# Patient Record
Sex: FEMALE | Race: WHITE | NOT HISPANIC OR LATINO | Employment: FULL TIME | URBAN - METROPOLITAN AREA
[De-identification: names, ages, dates, MRNs, and addresses within clinical notes are randomized per-mention and may not be internally consistent; named-entity substitution may affect disease eponyms.]

---

## 2018-04-02 PROBLEM — E04.1 THYROID NODULE: Status: ACTIVE | Noted: 2018-04-02

## 2018-06-19 PROBLEM — F41.1 GAD (GENERALIZED ANXIETY DISORDER): Status: ACTIVE | Noted: 2018-06-19

## 2018-06-19 PROBLEM — E03.9 HYPOTHYROIDISM: Status: ACTIVE | Noted: 2018-06-19

## 2019-11-20 PROBLEM — R10.13 EPIGASTRIC PAIN: Status: ACTIVE | Noted: 2019-11-20

## 2020-01-27 PROBLEM — R10.13 EPIGASTRIC PAIN: Status: RESOLVED | Noted: 2019-11-20 | Resolved: 2020-01-27

## 2021-03-22 PROBLEM — K64.9 HEMORRHOIDS: Status: ACTIVE | Noted: 2021-03-22

## 2021-03-29 ENCOUNTER — IMMUNIZATION (OUTPATIENT)
Dept: FAMILY MEDICINE | Facility: CLINIC | Age: 61
End: 2021-03-29
Payer: COMMERCIAL

## 2021-03-29 DIAGNOSIS — Z23 NEED FOR VACCINATION: Primary | ICD-10-CM

## 2021-03-29 PROCEDURE — 91300 COVID-19, MRNA, LNP-S, PF, 30 MCG/0.3 ML DOSE VACCINE: ICD-10-PCS | Mod: ,,, | Performed by: FAMILY MEDICINE

## 2021-03-29 PROCEDURE — 0001A COVID-19, MRNA, LNP-S, PF, 30 MCG/0.3 ML DOSE VACCINE: ICD-10-PCS | Mod: CV19,,, | Performed by: FAMILY MEDICINE

## 2021-03-29 PROCEDURE — 0001A COVID-19, MRNA, LNP-S, PF, 30 MCG/0.3 ML DOSE VACCINE: CPT | Mod: CV19,,, | Performed by: FAMILY MEDICINE

## 2021-03-29 PROCEDURE — 91300 COVID-19, MRNA, LNP-S, PF, 30 MCG/0.3 ML DOSE VACCINE: CPT | Mod: ,,, | Performed by: FAMILY MEDICINE

## 2021-04-19 ENCOUNTER — IMMUNIZATION (OUTPATIENT)
Dept: FAMILY MEDICINE | Facility: CLINIC | Age: 61
End: 2021-04-19
Payer: COMMERCIAL

## 2021-04-19 DIAGNOSIS — Z23 NEED FOR VACCINATION: Primary | ICD-10-CM

## 2021-04-19 PROCEDURE — 91300 COVID-19, MRNA, LNP-S, PF, 30 MCG/0.3 ML DOSE VACCINE: CPT | Mod: PBBFAC | Performed by: INTERNAL MEDICINE

## 2021-04-19 PROCEDURE — 0002A COVID-19, MRNA, LNP-S, PF, 30 MCG/0.3 ML DOSE VACCINE: CPT | Mod: PBBFAC | Performed by: INTERNAL MEDICINE

## 2021-10-11 PROBLEM — L70.9 ACNE: Status: ACTIVE | Noted: 2021-10-11

## 2021-10-11 PROBLEM — G47.00 INSOMNIA: Status: ACTIVE | Noted: 2021-10-11

## 2022-08-12 PROBLEM — Z79.890 HORMONE REPLACEMENT THERAPY (HRT): Status: ACTIVE | Noted: 2022-08-12

## 2022-10-21 PROBLEM — Z86.16 HISTORY OF COVID-19: Status: ACTIVE | Noted: 2022-08-04

## 2023-01-30 PROBLEM — M41.9 SCOLIOSIS: Status: ACTIVE | Noted: 2023-01-30

## 2024-03-07 PROBLEM — K30 DELAYED GASTRIC EMPTYING: Status: ACTIVE | Noted: 2024-03-07

## 2024-08-23 ENCOUNTER — TELEPHONE (OUTPATIENT)
Dept: DERMATOLOGY | Facility: CLINIC | Age: 64
End: 2024-08-23
Payer: MEDICARE

## 2024-08-26 ENCOUNTER — TELEPHONE (OUTPATIENT)
Dept: DERMATOLOGY | Facility: CLINIC | Age: 64
End: 2024-08-26
Payer: MEDICARE

## 2024-08-26 NOTE — TELEPHONE ENCOUNTER
----- Message from Jessica Blanco sent at 8/26/2024  2:12 PM CDT -----  Contact: PT  Type:  Sooner Appointment Request    Caller is requesting a sooner appointment.  Caller declined first available appointment listed below.  Caller will not accept being placed on the waitlist and is requesting a message be sent to doctor.    Name of Caller:  PT  When is the first available appointment?  No solutions  Symptoms:  Scalp irritation  Would the patient rather a call back or a response via [facility]?  Call back  Best Call Back Number:  [phone]  Additional Information:  PT has referral in system to see provider

## 2024-09-12 PROBLEM — Z78.0 POST-MENOPAUSAL: Status: ACTIVE | Noted: 2024-09-12

## 2024-09-23 ENCOUNTER — TELEPHONE (OUTPATIENT)
Dept: DERMATOLOGY | Facility: CLINIC | Age: 64
End: 2024-09-23

## 2024-09-23 ENCOUNTER — OFFICE VISIT (OUTPATIENT)
Dept: DERMATOLOGY | Facility: CLINIC | Age: 64
End: 2024-09-23
Payer: MEDICARE

## 2024-09-23 DIAGNOSIS — L82.1 SEBORRHEIC KERATOSES: Primary | ICD-10-CM

## 2024-09-23 DIAGNOSIS — R23.8 SCALP IRRITATION: ICD-10-CM

## 2024-09-23 DIAGNOSIS — L81.4 LENTIGO: ICD-10-CM

## 2024-09-23 DIAGNOSIS — L70.0 ACNE VULGARIS: ICD-10-CM

## 2024-09-23 DIAGNOSIS — L70.9 ACNE, UNSPECIFIED ACNE TYPE: ICD-10-CM

## 2024-09-23 PROCEDURE — 99203 OFFICE O/P NEW LOW 30 MIN: CPT | Mod: S$GLB,,, | Performed by: DERMATOLOGY

## 2024-09-23 PROCEDURE — 99999 PR PBB SHADOW E&M-EST. PATIENT-LVL I: CPT | Mod: PBBFAC,,, | Performed by: DERMATOLOGY

## 2024-09-23 RX ORDER — SPIRONOLACTONE 100 MG/1
TABLET, FILM COATED ORAL
Qty: 90 TABLET | Refills: 1 | Status: SHIPPED | OUTPATIENT
Start: 2024-09-23

## 2024-09-23 RX ORDER — SPIRONOLACTONE 50 MG/1
100 TABLET, FILM COATED ORAL DAILY
Qty: 90 TABLET | Refills: 3 | Status: SHIPPED | OUTPATIENT
Start: 2024-09-23 | End: 2024-09-23

## 2024-09-23 NOTE — TELEPHONE ENCOUNTER
Please resent the spironolactone for 180 tablets, patient was told to take 100 mg ( 2 tabs daily) at her visit today but only 90 where sent in. Please resend the medication with 180 tablets as the pharmacy wont fill it for the 90 please. (3 month supply)

## 2024-09-23 NOTE — TELEPHONE ENCOUNTER
----- Message from Dina Garza sent at 9/23/2024 11:10 AM CDT -----  Type: Needs Medical Advice  Who Called:  Patient   Symptoms (please be specific):    How long has patient had these symptoms:    Pharmacy name and phone #:    Best Call Back Number: [phone]  Additional Information: Patient is requesting a call back from the nurse regarding her meds..

## 2024-09-23 NOTE — PROGRESS NOTES
Dermatology Outpatient Clinic Progress Note    Patient Name: Alison Parekh  Patient : 1960  MRN: [id number]  Date of Service: 2024    CC/HPI: Alison Parekh is a 64 y.o. year old female with history of  family history of skin cancer- sister had skin cancer  who presents today for check on lesion on scalp.  Patient reports waxy white lesion on scalp that has been there about 4 weeks. Patient reports lesion was itchy, not painful and did not bleed. Former patient of Dr. Franklin, has done accutane in the past, is on keiko 50 but still gets painful acne lesions in a hormonal distribution and a little bit of hirsutism by history.     ROS:   Dermatological ROS: positive for acne and skin lesion changes    Physical Exam   Head       Chest   Chest comments: Scattered lentigo chest and upper arms      Back       Abdomen       Lower extremities         Diagram Legend     Erythematous scaling macule/papule c/w actinic keratosis       Vascular papule c/w angioma      Pigmented verrucoid papule/plaque c/w seborrheic keratosis      Yellow umbilicated papule c/w sebaceous hyperplasia      Irregularly shaped tan macule c/w lentigo     1-2 mm smooth white papules consistent with Milia      Movable subcutaneous cyst with punctum c/w epidermal inclusion cyst      Subcutaneous movable cyst c/w pilar cyst      Firm pink to brown papule c/w dermatofibroma      Pedunculated fleshy papule(s) c/w skin tag(s)      Evenly pigmented macule c/w junctional nevus     Mildly variegated pigmented, slightly irregular-bordered macule c/w mildly atypical nevus      Flesh colored to evenly pigmented papule c/w intradermal nevus       Pink pearly papule/plaque c/w basal cell carcinoma      Erythematous hyperkeratotic cursted plaque c/w SCC      Surgical scar with no sign of skin cancer recurrence      Open and closed comedones      Inflammatory papules and pustules      Verrucoid papule consistent consistent with wart      Erythematous eczematous patches and plaques     Dystrophic onycholytic nail with subungual debris c/w onychomycosis     Umbilicated papule    Erythematous-base heme-crusted tan verrucoid plaque consistent with inflamed seborrheic keratosis     Erythematous Silvery Scaling Plaque c/w Psoriasis     See annotation    Assessment/Plan:    Diagnoses and all orders for this visit:    Seborrheic keratoses  - Benign nature of lesion discussed, monitor for changes    Scalp Lesions  - likely ISK, resolved on it's own    Acne - HORMONAL   - increase keiko from , decrease banana intake to QOD or QOOD, labs in 6 weeks due to previous hyperkalemia  -     spironolactone (ALDACTONE) 50 MG tablet; Take 2 tablets (100 mg total) by mouth once daily.  -     green cream lvl 9 too irritating - sent tretinoin 0.025% cream with hyaluronic acid via [facility]  Lentigo  - Benign nature of lesion discussed, monitor for changes  - Signs of lentigo maligna discussed, patient to rtc for suspicious changes    Follow up in 6 weeks for recheck K         Dat Ceja MD FAAD

## 2024-11-04 DIAGNOSIS — L70.0 ACNE VULGARIS: Primary | ICD-10-CM

## 2024-11-11 ENCOUNTER — OFFICE VISIT (OUTPATIENT)
Dept: DERMATOLOGY | Facility: CLINIC | Age: 64
End: 2024-11-11
Payer: MEDICARE

## 2024-11-11 ENCOUNTER — LAB VISIT (OUTPATIENT)
Dept: LAB | Facility: HOSPITAL | Age: 64
End: 2024-11-11
Attending: INTERNAL MEDICINE
Payer: MEDICARE

## 2024-11-11 DIAGNOSIS — L70.0 ACNE VULGARIS: Primary | ICD-10-CM

## 2024-11-11 DIAGNOSIS — L70.9 ACNE, UNSPECIFIED ACNE TYPE: ICD-10-CM

## 2024-11-11 DIAGNOSIS — L70.9 ACNE, UNSPECIFIED ACNE TYPE: Primary | ICD-10-CM

## 2024-11-11 DIAGNOSIS — L68.0 HIRSUTISM: ICD-10-CM

## 2024-11-11 LAB
ANION GAP SERPL CALC-SCNC: 7 MMOL/L (ref 8–16)
BUN SERPL-MCNC: 12 MG/DL (ref 8–23)
CALCIUM SERPL-MCNC: 9.1 MG/DL (ref 8.7–10.5)
CHLORIDE SERPL-SCNC: 103 MMOL/L (ref 95–110)
CO2 SERPL-SCNC: 26 MMOL/L (ref 23–29)
CREAT SERPL-MCNC: 0.8 MG/DL (ref 0.5–1.4)
EST. GFR  (NO RACE VARIABLE): >60 ML/MIN/1.73 M^2
GLUCOSE SERPL-MCNC: 91 MG/DL (ref 70–110)
POTASSIUM SERPL-SCNC: 4.3 MMOL/L (ref 3.5–5.1)
SODIUM SERPL-SCNC: 136 MMOL/L (ref 136–145)

## 2024-11-11 PROCEDURE — 36415 COLL VENOUS BLD VENIPUNCTURE: CPT | Mod: PN | Performed by: DERMATOLOGY

## 2024-11-11 PROCEDURE — 80048 BASIC METABOLIC PNL TOTAL CA: CPT | Mod: PN | Performed by: DERMATOLOGY

## 2024-11-11 PROCEDURE — 99213 OFFICE O/P EST LOW 20 MIN: CPT | Mod: S$GLB,,, | Performed by: DERMATOLOGY

## 2024-11-11 NOTE — PROGRESS NOTES
Dermatology Outpatient Clinic Progress Note    Patient Name: Alison Parekh  Patient : 1960  MRN: [id number]  Date of Service: 2024    CC/HPI: Alison Parekh is a 64 y.o. year old female with history of cystic acne who presents today for 6 wk f/u  Patient reports taking spironolactone 100mg PO x 6wk, no side effects.     ROS:   Dermatological ROS: positive for skin lesion changes    Physical Exam   Head   Head comments: Few cystic lesions since last visit, definitely improved since starting keiko           Diagram Legend     Erythematous scaling macule/papule c/w actinic keratosis       Vascular papule c/w angioma      Pigmented verrucoid papule/plaque c/w seborrheic keratosis      Yellow umbilicated papule c/w sebaceous hyperplasia      Irregularly shaped tan macule c/w lentigo     1-2 mm smooth white papules consistent with Milia      Movable subcutaneous cyst with punctum c/w epidermal inclusion cyst      Subcutaneous movable cyst c/w pilar cyst      Firm pink to brown papule c/w dermatofibroma      Pedunculated fleshy papule(s) c/w skin tag(s)      Evenly pigmented macule c/w junctional nevus     Mildly variegated pigmented, slightly irregular-bordered macule c/w mildly atypical nevus      Flesh colored to evenly pigmented papule c/w intradermal nevus       Pink pearly papule/plaque c/w basal cell carcinoma      Erythematous hyperkeratotic cursted plaque c/w SCC      Surgical scar with no sign of skin cancer recurrence      Open and closed comedones      Inflammatory papules and pustules      Verrucoid papule consistent consistent with wart     Erythematous eczematous patches and plaques     Dystrophic onycholytic nail with subungual debris c/w onychomycosis     Umbilicated papule    Erythematous-base heme-crusted tan verrucoid plaque consistent with inflamed seborrheic keratosis     Erythematous Silvery Scaling Plaque c/w Psoriasis     See annotation    Assessment/Plan:    Diagnoses and  all orders for this visit:    Acne vulgaris  - spironolactone 100mg QD doing well  - check k+ today BMP future ordered  - follow up 3mo    Hirsutism  - also improved on spironolactone   - check bath 3mo     Follow up in 3 months        Dat Ceja MD FAAD

## 2024-11-21 ENCOUNTER — TELEPHONE (OUTPATIENT)
Dept: DERMATOLOGY | Facility: CLINIC | Age: 64
End: 2024-11-21
Payer: MEDICARE

## 2024-11-21 NOTE — TELEPHONE ENCOUNTER
----- Message from Dat Ceja MD sent at 11/21/2024  1:09 PM CST -----  Sent same Rx with increased Tret to .05%  ----- Message -----  From: Ermelinda Mary MA  Sent: 11/20/2024   4:13 PM CST  To: Dat Ceja MD    Patient called asking for a refill on her tretinoin to [facility].   She is wanting to go up in dose, she is currently using 0.25% requesting 0.50% with refills please  ----- Message -----  From: Dina Garza  Sent: 11/20/2024  12:56 PM CST  To: Brenna Daugherty Staff    Type: Needs Medical Advice  Who Called:  patient  Symptoms (please be specific):    How long has patient had these symptoms:   Pharmacy name and phone #:    Best Call Back Number: [phone]  Additional Information: Patient is requesting a call back regarding a new prescript being sent to pharmacy.

## 2025-02-10 ENCOUNTER — OFFICE VISIT (OUTPATIENT)
Dept: DERMATOLOGY | Facility: CLINIC | Age: 65
End: 2025-02-10
Payer: MEDICARE

## 2025-02-10 DIAGNOSIS — D48.5 NEOPLASM OF UNCERTAIN BEHAVIOR OF SKIN: ICD-10-CM

## 2025-02-10 DIAGNOSIS — L70.0 ACNE VULGARIS: Primary | ICD-10-CM

## 2025-02-10 PROCEDURE — 1159F MED LIST DOCD IN RCRD: CPT | Mod: CPTII,S$GLB,, | Performed by: DERMATOLOGY

## 2025-02-10 PROCEDURE — 99999 PR PBB SHADOW E&M-EST. PATIENT-LVL II: CPT | Mod: PBBFAC,,, | Performed by: DERMATOLOGY

## 2025-02-10 PROCEDURE — 99214 OFFICE O/P EST MOD 30 MIN: CPT | Mod: S$GLB,,, | Performed by: DERMATOLOGY

## 2025-02-10 PROCEDURE — 1101F PT FALLS ASSESS-DOCD LE1/YR: CPT | Mod: CPTII,S$GLB,, | Performed by: DERMATOLOGY

## 2025-02-10 PROCEDURE — 3288F FALL RISK ASSESSMENT DOCD: CPT | Mod: CPTII,S$GLB,, | Performed by: DERMATOLOGY

## 2025-02-10 NOTE — PROGRESS NOTES
Dermatology Outpatient Clinic Progress Note    Patient Name: Alison Parekh  Patient : 1960  MRN: [id number]  Date of Service: 2/10/2025    CC/HPI: Alison Parekh is a 64 y.o. year old female with history of  cystic acne  who presents today for follow up on spironolactone.  Patient reports she cut back to 50mg 2-3 weeks ago as she was feeling tired and rings were getting tired, she did have a breakout after cutting dosage.    ROS:   Dermatological ROS: positive for skin lesion changes    Physical Exam   Head       Abdomen   Abdomen comments: Irritated papule consistent with irritated seb keratosis vs. Molloscum vs. other          Diagram Legend     Erythematous scaling macule/papule c/w actinic keratosis       Vascular papule c/w angioma      Pigmented verrucoid papule/plaque c/w seborrheic keratosis      Yellow umbilicated papule c/w sebaceous hyperplasia      Irregularly shaped tan macule c/w lentigo     1-2 mm smooth white papules consistent with Milia      Movable subcutaneous cyst with punctum c/w epidermal inclusion cyst      Subcutaneous movable cyst c/w pilar cyst      Firm pink to brown papule c/w dermatofibroma      Pedunculated fleshy papule(s) c/w skin tag(s)      Evenly pigmented macule c/w junctional nevus     Mildly variegated pigmented, slightly irregular-bordered macule c/w mildly atypical nevus      Flesh colored to evenly pigmented papule c/w intradermal nevus       Pink pearly papule/plaque c/w basal cell carcinoma      Erythematous hyperkeratotic cursted plaque c/w SCC      Surgical scar with no sign of skin cancer recurrence      Open and closed comedones      Inflammatory papules and pustules      Verrucoid papule consistent consistent with wart     Erythematous eczematous patches and plaques     Dystrophic onycholytic nail with subungual debris c/w onychomycosis     Umbilicated papule    Erythematous-base heme-crusted tan verrucoid plaque consistent with inflamed seborrheic  "keratosis     Erythematous Silvery Scaling Plaque c/w Psoriasis     See annotation    Assessment/Plan:    Alison Zaragoza" was seen today for acne.    Diagnoses and all orders for this visit:    Acne vulgaris - flaring  - Pt. Decreased from 100mg QD to 50mg QD spironolactone  - Had some hand and foot swelling and fatigue that seemed worse on 100mg   - Reports fatigue and swelling resolved, advised to consider re-trial possibly of the higher dose now that symptoms resolved  - last K+ was WNL after increase  - Trial of BP wash QAM 3.5% - followed by clascoterone  - continue retinA 0.5% QHS - irrtating - counseled to dilute with her neutrogena hydroboost  - trial of Winlevi as this should avoid systemic SE of keiko and irritation of topical retinA.   - Has failed other topicals in the past including clinda and dapsone by history.     NUB abdomen - new   - molloscum vs. Isk vs. VV vs. NMSC vs. Other - excoriated on exam today  - re-eval in 2 months RTC if changing or growing for biopsy    Follow up in 2 months        Dat Ceja MD FAAD        "

## 2025-04-23 ENCOUNTER — PROCEDURE VISIT (OUTPATIENT)
Dept: DERMATOLOGY | Facility: CLINIC | Age: 65
End: 2025-04-23
Payer: MEDICARE

## 2025-04-23 DIAGNOSIS — D17.9 LIPOMA, UNSPECIFIED SITE: ICD-10-CM

## 2025-04-23 DIAGNOSIS — D22.9 MULTIPLE NEVI: ICD-10-CM

## 2025-04-23 DIAGNOSIS — L70.9 ACNE, UNSPECIFIED ACNE TYPE: ICD-10-CM

## 2025-04-23 DIAGNOSIS — L70.0 CYSTIC ACNE: Primary | ICD-10-CM

## 2025-04-23 DIAGNOSIS — D48.5 NEOPLASM OF UNCERTAIN BEHAVIOR OF SKIN: ICD-10-CM

## 2025-04-23 RX ORDER — SPIRONOLACTONE 100 MG/1
TABLET, FILM COATED ORAL
Qty: 90 TABLET | Refills: 2 | Status: SHIPPED | OUTPATIENT
Start: 2025-04-23

## 2025-04-23 RX ORDER — SPIRONOLACTONE 100 MG/1
TABLET, FILM COATED ORAL
Qty: 90 TABLET | Refills: 2 | Status: SHIPPED | OUTPATIENT
Start: 2025-04-23 | End: 2025-04-23 | Stop reason: SDUPTHER

## 2025-04-23 NOTE — PROGRESS NOTES
Dermatology Outpatient Clinic Progress Note    Patient Name: Alison Parekh  Patient : 1960  MRN: [id number]  Date of Service: 2025    Subjective:      CC/HPI: Alison Parekh is a 64 y.o. year old female with history of  cystic acne  who presents today for 2 month follow up spironolactone 50mg. Patient reports doing better with 50mg keiko QD, clascoterone, retinA 0.05% cream QHS. Peeling at times but able to use retinA routinely.     ROS:   Review of Systems   Skin:  Positive for daily sunscreen use.       Objective:   Physical Exam   Constitutional: She appears well-developed and well-nourished. No distress.   Skin:               Diagram Legend     Erythematous scaling macule/papule c/w actinic keratosis       Vascular papule c/w angioma      Pigmented verrucoid papule/plaque c/w seborrheic keratosis      Yellow umbilicated papule c/w sebaceous hyperplasia      Irregularly shaped tan macule c/w lentigo     1-2 mm smooth white papules consistent with Milia      Movable subcutaneous cyst with punctum c/w epidermal inclusion cyst      Subcutaneous movable cyst c/w pilar cyst      Firm pink to brown papule c/w dermatofibroma      Pedunculated fleshy papule(s) c/w skin tag(s)      Evenly pigmented macule c/w junctional nevus     Mildly variegated pigmented, slightly irregular-bordered macule c/w mildly atypical nevus      Flesh colored to evenly pigmented papule c/w intradermal nevus       Pink pearly papule/plaque c/w basal cell carcinoma      Erythematous hyperkeratotic cursted plaque c/w SCC      Surgical scar with no sign of skin cancer recurrence      Open and closed comedones      Inflammatory papules and pustules      Verrucoid papule consistent consistent with wart     Erythematous eczematous patches and plaques     Dystrophic onycholytic nail with subungual debris c/w onychomycosis     Umbilicated papule    Erythematous-base heme-crusted tan verrucoid plaque consistent with inflamed  "seborrheic keratosis     Erythematous Silvery Scaling Plaque c/w Psoriasis     See annotation      Assessment / Plan:      Pathology Orders:       Normal Orders This Visit    Specimen to Pathology, Dermatology     Questions:    Procedure Type: Dermatology and skin neoplasms    Number of Specimens: 1    ------------------------: -------------------------    Spec 1 Procedure: Shave Biopsy    Spec 1 Clinical Impression: ak vs bcc vs scc vs .other    Spec 1 Source: right superomedial cheek    Clinical Information: see EPIC    Clinical History: see EPIC    Specimen Source: Skin    Release to patient: Immediate    Send normal result to authorizing provider's In Basket if patient is active on MyChart: Yes          Cystic acne  -     triamcinolone acetonide injection 10 mg  -     MS [id number] ACETONIDE INJ PER 10MG  -     MS INJECTION INTO SKIN LESIONS, UP TO 7  -     continue clascoterone, retinA, and oral keiko 50mg QD     Intralesional Triamcinolone Injection Procedure  Intralesional Kenalog 2.5mg/cc (0.2 cc total) injected into 2 lesions on the right chin today after obtaining verbal consent including risk of surrounding hypopigmentation. Patient tolerated procedure well.     Units: 1  NDC for Kenalog 10mg/cc:  9233-2087-59  Alison Zaragoza" was seen today for acne.    Diagnoses and all orders for this visit:    Cystic acne  -     triamcinolone acetonide injection 10 mg  -     MS [id number] ACETONIDE INJ PER 10MG  -     MS INJECTION INTO SKIN LESIONS, UP TO 7    Acne, unspecified acne type  -     spironolactone (ALDACTONE) 100 MG tablet 50mg QD 12 mo supply    Neoplasm of uncertain behavior of skin  Shave Biopsy Procedure Note  Risks, benefits, limitations of shave biopsy discussed. Areas cleansed with alcohol, photographs of suspicious lesions taken in [place]. 1 lesion(s) numbed with 1% lidocaine with 1:200,000 epinephrine. Lesions removed with razor surgery and sent for pathology in formalin. Hemostasis achieved with " "alumuninum chloride. Polysporin and a band-aid applied.  Will call patient with results once available. Patient tolerated procedure well.     Alison Zaragoza" was seen today for acne.    Nevus  - Benign nature of lesion discussed, monitor for changes    Diagnoses and all orders for this visit:    Follow up in 6 months        Dat Ceja MD FAAD        "

## 2025-04-25 ENCOUNTER — TELEPHONE (OUTPATIENT)
Dept: DERMATOLOGY | Facility: CLINIC | Age: 65
End: 2025-04-25
Payer: MEDICARE

## 2025-04-25 NOTE — TELEPHONE ENCOUNTER
Called to speak to patient about the allergic reaction to bandaids. Advised her to discontinue use and try telfa and paper tape.

## 2025-04-28 ENCOUNTER — RESULTS FOLLOW-UP (OUTPATIENT)
Dept: DERMATOLOGY | Facility: CLINIC | Age: 65
End: 2025-04-28

## 2025-05-20 ENCOUNTER — OFFICE VISIT (OUTPATIENT)
Dept: DERMATOLOGY | Facility: CLINIC | Age: 65
End: 2025-05-20
Payer: MEDICARE

## 2025-05-20 VITALS — WEIGHT: 118 LBS | HEIGHT: 62 IN | BODY MASS INDEX: 21.71 KG/M2

## 2025-05-20 DIAGNOSIS — L57.0 ACTINIC KERATOSES: Primary | ICD-10-CM

## 2025-05-20 PROCEDURE — 99999 PR PBB SHADOW E&M-EST. PATIENT-LVL II: CPT | Mod: PBBFAC,,, | Performed by: DERMATOLOGY

## 2025-05-20 PROCEDURE — 17000 DESTRUCT PREMALG LESION: CPT | Mod: S$GLB,,, | Performed by: DERMATOLOGY

## 2025-05-20 PROCEDURE — 99499 UNLISTED E&M SERVICE: CPT | Mod: S$GLB,,, | Performed by: DERMATOLOGY

## 2025-05-20 RX ORDER — NITROFURANTOIN (MACROCRYSTALS) 100 MG/1
100 CAPSULE ORAL 4 TIMES DAILY
COMMUNITY

## 2025-05-20 NOTE — PROGRESS NOTES
Dermatology Outpatient Clinic Progress Note    Patient Name: Alison Parekh  Patient : 1960  MRN: [id number]  Date of Service: 2025    Subjective:      CC/HPI: Alison Parekh is a 64 y.o. year old female with history of actinic keratosis and acne vulgaris who presents today for follow up LN2 treatment to biopsy proven AK on right cheek.    ROS:   Dermatological ROS: positive for none    Objective:   Physical Exam   Head           Diagram Legend     Erythematous scaling macule/papule c/w actinic keratosis       Vascular papule c/w angioma      Pigmented verrucoid papule/plaque c/w seborrheic keratosis      Yellow umbilicated papule c/w sebaceous hyperplasia      Irregularly shaped tan macule c/w lentigo     1-2 mm smooth white papules consistent with Milia      Movable subcutaneous cyst with punctum c/w epidermal inclusion cyst      Subcutaneous movable cyst c/w pilar cyst      Firm pink to brown papule c/w dermatofibroma      Pedunculated fleshy papule(s) c/w skin tag(s)      Evenly pigmented macule c/w junctional nevus     Mildly variegated pigmented, slightly irregular-bordered macule c/w mildly atypical nevus      Flesh colored to evenly pigmented papule c/w intradermal nevus       Pink pearly papule/plaque c/w basal cell carcinoma      Erythematous hyperkeratotic cursted plaque c/w SCC      Surgical scar with no sign of skin cancer recurrence      Open and closed comedones      Inflammatory papules and pustules      Verrucoid papule consistent consistent with wart     Erythematous eczematous patches and plaques     Dystrophic onycholytic nail with subungual debris c/w onychomycosis     Umbilicated papule    Erythematous-base heme-crusted tan verrucoid plaque consistent with inflamed seborrheic keratosis     Erythematous Silvery Scaling Plaque c/w Psoriasis     See annotation      Assessment / Plan:        Actinic keratoses    Cryosurgery Procedure Note  The patient was informed that we  would be destroying lesions via cryosurgery, the risks of pain, vesiculation, and potentially persistent discoloration were discussed.   Patient expressed verbal consent. 1 premalignant  lesions were treated with a 15 second freeze thaw cycle. Thicker lesions were treated with two cycles to enhance chances of complete resolution. Patient tolerated procedure well, no wound care necessary.     Follow up in 6 weeks if lesions do not resolve.       Follow up in 6 weeks SUZANNA Ceja MD FAAD